# Patient Record
Sex: FEMALE | Race: WHITE
[De-identification: names, ages, dates, MRNs, and addresses within clinical notes are randomized per-mention and may not be internally consistent; named-entity substitution may affect disease eponyms.]

---

## 2018-05-04 ENCOUNTER — HOSPITAL ENCOUNTER (OUTPATIENT)
Dept: HOSPITAL 38 - CC.SDS | Age: 83
End: 2018-05-04
Attending: FAMILY MEDICINE
Payer: MEDICARE

## 2018-05-04 DIAGNOSIS — Z99.89: ICD-10-CM

## 2018-05-04 DIAGNOSIS — D64.9: ICD-10-CM

## 2018-05-04 DIAGNOSIS — K25.9: ICD-10-CM

## 2018-05-04 DIAGNOSIS — Z86.010: ICD-10-CM

## 2018-05-04 DIAGNOSIS — K21.9: Primary | ICD-10-CM

## 2018-05-04 DIAGNOSIS — G47.33: ICD-10-CM

## 2018-05-04 DIAGNOSIS — Z79.82: ICD-10-CM

## 2018-05-04 DIAGNOSIS — E66.01: ICD-10-CM

## 2018-05-04 DIAGNOSIS — Z88.1: ICD-10-CM

## 2018-05-04 DIAGNOSIS — Z79.899: ICD-10-CM

## 2018-05-04 DIAGNOSIS — K44.9: ICD-10-CM

## 2018-05-04 NOTE — OR
DATE OF OPERATION:  05/04/2018

 

PREOPERATIVE DIAGNOSIS:

1. ALTERED BOWEL HABITS.

2. CHRONIC GASTROESOPHAGEAL REFLUX DISEASE.

 

POSTOPERATIVE DIAGNOSIS:

1. ALTERED BOWEL HABITS.

2. CHRONIC GASTROESOPHAGEAL REFLUX DISEASE.

 

SURGEON:  Jerry Short MD

 

PROCEDURE:

1. EGD WITH BIOPSIES X2, AUGUSTUS.

2. ATTEMPTED BUT DISCONTINUED COLONOSCOPY DUE TO POOR PREP.

 

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

 

COMPLICATIONS:  None.

 

SPECIMEN:

1. Antral biopsy x2.

2. AUGUSTUS.

 

FINDINGS:

1. Full-length EGD.

2. Peptic ulcer disease with multiple antral erosions.

3. Small hiatal hernia with minimal spontaneous GERD, no esophagitis.

4. Attempted colonoscopy, unsuccessful.

 

RECOMMENDATIONS:  The patient will be placed on proton pump therapy and have a

close followup with Dr. Neely regarding her GI symptoms.  She had a colonoscopy

two years ago with polyps removed.  Followup per Dr. Neely's choice on repeat

colonoscopy.

 

INDICATIONS:  The patient apparently has been having some issues with reflux and

altered bowel habits.  She does have a history of polyps removed in the past.

Dr. Neely sent her for upper and lower endoscopy.

 

DESCRIPTION OF PROCEDURE:  The patient was prepped and draped, placed in a left

lateral decubitus position.  A lubricated Olympus gastroscope was inserted over

a bit and advanced to the cricopharyngeus area and easily intubated in the

esophagus.  Esophageal lining was benign in its entire course.  The Z-line was

crisp and sharp around 38 cm.  A small hiatal hernia was present with some

minimal spontaneous reflux.  No distal esophagitis, stricturing, ulceration, or

Ponce's changes were seen.  The scope was advanced into the stomach through

the pylorus and into the second portion of the duodenum.  This and the duodenal

bulb were benign.  The scope was brought back into the stomach and retroflexed.

The upper fundus and cardia appeared unremarkable.  Upon straightening, the rest

of the fundus was benign.  The patient does have evidence of peptic ulcer

disease with 4 to 5 small shallow erosions in the antrum.  Biopsies x2 were

taken directly of these.  A AUGUSTUS test was also obtained.  No other lesions were

seen.  Air was suctioned, and the scope was removed without complication.

 

A lubricated Olympus colonoscope was then inserted.  The patient had a

significant volume of stool in the rectal vault with continuous flow of solid

and liquid stool, dark.  It was hard to visualize anything and it was hard to

even get out of the rectal vault.  We elected to discontinue the scope at that

point as the patient is a higher risk anesthesia patient and was having some

hypotension as well.

 

BOLIVAR/LANCE

DD:  05/04/2018 09:35:41

DT:  05/04/2018 16:07:03

Job #:  [id number]